# Patient Record
Sex: FEMALE | Race: ASIAN | NOT HISPANIC OR LATINO
[De-identification: names, ages, dates, MRNs, and addresses within clinical notes are randomized per-mention and may not be internally consistent; named-entity substitution may affect disease eponyms.]

---

## 2018-10-25 ENCOUNTER — APPOINTMENT (OUTPATIENT)
Dept: SURGERY | Facility: CLINIC | Age: 33
End: 2018-10-25
Payer: COMMERCIAL

## 2018-10-25 VITALS
SYSTOLIC BLOOD PRESSURE: 132 MMHG | HEART RATE: 67 BPM | DIASTOLIC BLOOD PRESSURE: 78 MMHG | TEMPERATURE: 98.2 F | HEIGHT: 63 IN | BODY MASS INDEX: 27.46 KG/M2 | WEIGHT: 155 LBS

## 2018-10-25 PROCEDURE — 99203 OFFICE O/P NEW LOW 30 MIN: CPT

## 2018-10-27 LAB
ANION GAP SERPL CALC-SCNC: 13 MMOL/L
BUN SERPL-MCNC: 9 MG/DL
CALCIUM SERPL-MCNC: 9.2 MG/DL
CHLORIDE SERPL-SCNC: 104 MMOL/L
CO2 SERPL-SCNC: 24 MMOL/L
CREAT SERPL-MCNC: 0.53 MG/DL
GLUCOSE SERPL-MCNC: 90 MG/DL
POTASSIUM SERPL-SCNC: 3.9 MMOL/L
SODIUM SERPL-SCNC: 141 MMOL/L

## 2021-02-03 ENCOUNTER — APPOINTMENT (OUTPATIENT)
Dept: SURGERY | Facility: CLINIC | Age: 36
End: 2021-02-03
Payer: COMMERCIAL

## 2021-02-03 VITALS
HEART RATE: 76 BPM | WEIGHT: 162 LBS | TEMPERATURE: 97.7 F | BODY MASS INDEX: 28.7 KG/M2 | HEIGHT: 63 IN | DIASTOLIC BLOOD PRESSURE: 94 MMHG | SYSTOLIC BLOOD PRESSURE: 139 MMHG

## 2021-02-03 DIAGNOSIS — I10 ESSENTIAL (PRIMARY) HYPERTENSION: ICD-10-CM

## 2021-02-03 DIAGNOSIS — Z82.49 FAMILY HISTORY OF ISCHEMIC HEART DISEASE AND OTHER DISEASES OF THE CIRCULATORY SYSTEM: ICD-10-CM

## 2021-02-03 DIAGNOSIS — Z83.3 FAMILY HISTORY OF DIABETES MELLITUS: ICD-10-CM

## 2021-02-03 DIAGNOSIS — K60.3 ANAL FISTULA: ICD-10-CM

## 2021-02-03 DIAGNOSIS — E07.9 DISORDER OF THYROID, UNSPECIFIED: ICD-10-CM

## 2021-02-03 DIAGNOSIS — Z56.0 UNEMPLOYMENT, UNSPECIFIED: ICD-10-CM

## 2021-02-03 PROCEDURE — 99213 OFFICE O/P EST LOW 20 MIN: CPT

## 2021-02-03 PROCEDURE — 99072 ADDL SUPL MATRL&STAF TM PHE: CPT

## 2021-02-03 RX ORDER — LEVOTHYROXINE SODIUM 0.17 MG/1
TABLET ORAL
Refills: 0 | Status: ACTIVE | COMMUNITY

## 2021-02-03 RX ORDER — HYDROCHLOROTHIAZIDE 12.5 MG/1
TABLET ORAL
Refills: 0 | Status: ACTIVE | COMMUNITY

## 2021-02-03 RX ORDER — CHROMIUM 200 MCG
TABLET ORAL
Refills: 0 | Status: ACTIVE | COMMUNITY

## 2021-02-03 SDOH — ECONOMIC STABILITY - INCOME SECURITY: UNEMPLOYMENT, UNSPECIFIED: Z56.0

## 2021-02-04 PROBLEM — I10 HIGH BLOOD PRESSURE: Status: ACTIVE | Noted: 2021-02-03

## 2021-02-05 PROBLEM — K60.3 ANAL FISTULA: Status: ACTIVE | Noted: 2021-02-05

## 2021-02-05 RX ORDER — CLINDAMYCIN HYDROCHLORIDE 300 MG/1
300 CAPSULE ORAL
Qty: 30 | Refills: 0 | Status: DISCONTINUED | COMMUNITY
Start: 2021-01-28

## 2021-02-05 RX ORDER — NAPROXEN 500 MG/1
500 TABLET ORAL
Qty: 30 | Refills: 0 | Status: DISCONTINUED | COMMUNITY
Start: 2020-08-06

## 2021-02-05 RX ORDER — ERGOCALCIFEROL 1.25 MG/1
1.25 MG CAPSULE, LIQUID FILLED ORAL
Qty: 12 | Refills: 0 | Status: ACTIVE | COMMUNITY
Start: 2020-12-26

## 2021-02-05 RX ORDER — DOCUSATE SODIUM 100 MG/1
100 CAPSULE, LIQUID FILLED ORAL
Qty: 30 | Refills: 0 | Status: ACTIVE | COMMUNITY
Start: 2020-10-12

## 2021-02-05 RX ORDER — ACETAMINOPHEN EXTRA STRENGTH 500 MG/1
500 TABLET ORAL
Qty: 32 | Refills: 0 | Status: DISCONTINUED | COMMUNITY
Start: 2020-10-31

## 2021-02-05 RX ORDER — IBUPROFEN 400 MG/1
400 TABLET, FILM COATED ORAL
Qty: 16 | Refills: 0 | Status: DISCONTINUED | COMMUNITY
Start: 2020-10-31

## 2021-02-05 RX ORDER — BACITRACIN 500 [IU]/G
500 OINTMENT TOPICAL
Qty: 28 | Refills: 0 | Status: DISCONTINUED | COMMUNITY
Start: 2020-10-31

## 2021-02-05 RX ORDER — OXYCODONE AND ACETAMINOPHEN 5; 325 MG/1; MG/1
5-325 TABLET ORAL
Qty: 8 | Refills: 0 | Status: ACTIVE | COMMUNITY
Start: 2020-10-12

## 2021-02-05 RX ORDER — MECLIZINE HYDROCHLORIDE 12.5 MG/1
12.5 TABLET ORAL
Qty: 30 | Refills: 0 | Status: ACTIVE | COMMUNITY
Start: 2020-12-26

## 2021-02-05 RX ORDER — TIZANIDINE 2 MG/1
2 TABLET ORAL
Qty: 30 | Refills: 0 | Status: ACTIVE | COMMUNITY
Start: 2020-08-06

## 2021-02-05 NOTE — PHYSICAL EXAM
[No Rash or Lesion] : No rash or lesion [Alert] : alert [Oriented to Person] : oriented to person [Oriented to Place] : oriented to place [Oriented to Time] : oriented to time [Calm] : calm [de-identified] : The patient is alert, well-groomed, well developed and cheerful.  [de-identified] : Head is normocephalic. Conjunctiva pink, anicteric. Nasal mucosa pink, septum midline. Oral mucosa pink. Tongue midline, pharynx without exudates. \par \par   [de-identified] : Neck supple. Trachea midline. Thyroid isthmus barely palpable, lobes not felt.\par   [de-identified] : Breath sounds equal and bilateral, no wheezing no rales or rhonchi  [de-identified] :  good S1, S2, no m/r/g bilateral  [de-identified] : Normoactive bowel sounds, soft and nontender, no hepatosplenomegaly or masses noted, [de-identified] : right perirectal hardness no drainage identified and no erythema, mildly tender to deep palpation of right inschiorectal fossa.  [de-identified] : WNL

## 2021-02-05 NOTE — HISTORY OF PRESENT ILLNESS
[de-identified] : KEITH LY is a 35 year old female who presents in the office for follow up visit for perirectal abscess.Patient has this condition for over 2 years. This abscess was drained 2 times and she had a hospital stay in 2018 in [facility] and was on IV antibiotics and had IR drainage at that time. Patient denies any fevers, chills, nausea, vomiting, diarrhea or constipation. Patient able to tolerate regular diet with normal bowel movements. Today she is c/o some hardness on right perianal aria, without any drainage.  [de-identified] : \par

## 2021-02-05 NOTE — ASSESSMENT
[FreeTextEntry1] : KEITH LY is a 35 year old female with perirectal abscess vs  fissure. \par \par \par Patient has a history of perirectal abscess was drained 2 times and she had a hospital stay in 2018 in [facility] and was on IV antibiotics and had IR drainage at that time. Patient stated that she had MRI done and will obtain the report and bring it in the office. On physical exam right perirectal hardness without any drainage. \par Patient will be referred to colorectal surgeon--Dr. Rojo for treatment of what seems to be a chronic supralevator fistula/abscess

## 2021-02-05 NOTE — CONSULT LETTER
[Dear  ___] : Dear  [unfilled], [Courtesy Letter:] : I had the pleasure of seeing your patient, [unfilled], in my office today. [Consult Closing:] : Thank you very much for allowing me to participate in the care of this patient.  If you have any questions, please do not hesitate to contact me. [DrDong  ___] : Dr. COBB

## 2021-02-09 ENCOUNTER — APPOINTMENT (OUTPATIENT)
Dept: SURGERY | Facility: CLINIC | Age: 36
End: 2021-02-09
Payer: COMMERCIAL

## 2021-02-09 VITALS
BODY MASS INDEX: 28.7 KG/M2 | SYSTOLIC BLOOD PRESSURE: 119 MMHG | DIASTOLIC BLOOD PRESSURE: 83 MMHG | TEMPERATURE: 97.8 F | HEART RATE: 81 BPM | HEIGHT: 63 IN | WEIGHT: 162 LBS | OXYGEN SATURATION: 99 %

## 2021-02-09 DIAGNOSIS — Z86.79 PERSONAL HISTORY OF OTHER DISEASES OF THE CIRCULATORY SYSTEM: ICD-10-CM

## 2021-02-09 DIAGNOSIS — Z86.39 PERSONAL HISTORY OF OTHER ENDOCRINE, NUTRITIONAL AND METABOLIC DISEASE: ICD-10-CM

## 2021-02-09 DIAGNOSIS — R10.84 GENERALIZED ABDOMINAL PAIN: ICD-10-CM

## 2021-02-09 PROCEDURE — 99203 OFFICE O/P NEW LOW 30 MIN: CPT | Mod: 25

## 2021-02-09 PROCEDURE — 46600 DIAGNOSTIC ANOSCOPY SPX: CPT

## 2021-02-09 PROCEDURE — 99072 ADDL SUPL MATRL&STAF TM PHE: CPT

## 2021-02-09 NOTE — REVIEW OF SYSTEMS
[Fever] : no fever [Chills] : no chills [Recent Weight Loss (___ Lbs)] : no recent weight loss [Eye Pain] : no eye pain [Earache] : no earache [Shortness Of Breath] : no shortness of breath [Cough] : no cough [Abdominal Pain] : abdominal pain [Vomiting] : no vomiting [Constipation] : no constipation [Dysuria] : no dysuria [Diarrhea] : no diarrhea [Negative] : Musculoskeletal

## 2021-02-09 NOTE — HISTORY OF PRESENT ILLNESS
[FreeTextEntry1] : Ms. Gauri Copeland is a 35y.o. F referred for recurrent perirectal pain. This started back in 2018 at which time she had a perirectal abscess drained at [facility] at which time she was admitted and treated with abx for 9days. More recently she underwent an EUA on 2/10/20 (no fistula identified at the time) and then another drainage procedure on [place]12/20 ([place] [facility] She states that ever since this all started she has had recurrent swelling and pain on her right perirectal area but w/o any drainage. She reports a prior history of constipation which improved after her pregnancy. Now she has almost daily BMs which are soft and occasionally involve straining. She denies any personal or family history of IBD. Unclear if she has had a colonoscopy in the past. She also reports some intermittent RLQ abdominal pain at night for the past few months centered over her open appy scar. Last pelvic imaging was prior to the drainage in October and was a MRI for which she doesn't have the report. Has sex w/ men and denies anal receptive sex.

## 2021-02-09 NOTE — PHYSICAL EXAM
[Excoriation] : no perianal excoriation [Fistula] : no fistulas [Skin Tags] : there were no residual hemorrhoidal skin tags seen [Normal] : was normal [Right Side] : on the right [None] : there was no rectal abscess [Stool Sample Taken] : no stool obtained on rectal exam [Gross Blood] : no gross blood [JVD] : no jugular venous distention  [Normal Breath Sounds] : Normal breath sounds [Wheezing] : no wheezing was heard [Normal Heart Sounds] : normal heart sounds [Normal Rate and Rhythm] : normal rate and rhythm [Alert] : alert [Oriented to Person] : oriented to person [Oriented to Place] : oriented to place [Oriented to Time] : oriented to time [de-identified] : soft, non-distended, non-tender to palpitation, well healed, RLQ open appy scar [de-identified] : no external hemorrhoids, scar from prior I&D w/o any palpable fluctuance, on JULIEN mild firmness on the R side w/ associated discomfort, significant pain from anoscopy [de-identified] : awake, alert, in NAD [de-identified] : normocephalic, atraumatic, EOMI, nl conjunctiva [de-identified] : b/l chest rise, EWOB on RA [de-identified] : deferred [de-identified] : normal strength [de-identified] : perianal skin as above [de-identified] : normal mood and affect

## 2021-04-28 NOTE — PROCEDURE
[phone] [FreeTextEntry1] : Anoscopy - with large lighted disposable anoscope; exam limited by discomfort; normal rectal mucosa some internal hemorrhoids

## 2021-04-29 ENCOUNTER — APPOINTMENT (OUTPATIENT)
Dept: SURGERY | Facility: CLINIC | Age: 36
End: 2021-04-29
Payer: COMMERCIAL

## 2021-04-29 VITALS
BODY MASS INDEX: 29.23 KG/M2 | HEART RATE: 93 BPM | HEIGHT: 63 IN | SYSTOLIC BLOOD PRESSURE: 121 MMHG | DIASTOLIC BLOOD PRESSURE: 79 MMHG | WEIGHT: 165 LBS

## 2021-04-29 VITALS — TEMPERATURE: 96.6 F

## 2021-04-29 DIAGNOSIS — L02.31 CUTANEOUS ABSCESS OF BUTTOCK: ICD-10-CM

## 2021-04-29 PROCEDURE — 46600 DIAGNOSTIC ANOSCOPY SPX: CPT

## 2021-04-29 PROCEDURE — 99072 ADDL SUPL MATRL&STAF TM PHE: CPT

## 2021-04-30 ENCOUNTER — EMERGENCY (EMERGENCY)
Facility: HOSPITAL | Age: 36
LOS: 1 days | Discharge: HOME | End: 2021-04-30
Attending: STUDENT IN AN ORGANIZED HEALTH CARE EDUCATION/TRAINING PROGRAM
Payer: COMMERCIAL

## 2021-04-30 VITALS
TEMPERATURE: 98 F | DIASTOLIC BLOOD PRESSURE: 87 MMHG | RESPIRATION RATE: 17 BRPM | HEART RATE: 85 BPM | SYSTOLIC BLOOD PRESSURE: 133 MMHG | WEIGHT: 165.35 LBS | OXYGEN SATURATION: 100 %

## 2021-04-30 PROCEDURE — 99283 EMERGENCY DEPT VISIT LOW MDM: CPT

## 2021-04-30 PROCEDURE — 99284 EMERGENCY DEPT VISIT MOD MDM: CPT

## 2021-04-30 NOTE — PROCEDURE
[FreeTextEntry1] : Anoscopy - performed with small lighted disposable anoscope; normal rectal mucosa, unremarkable internal hemorrhoids

## 2021-04-30 NOTE — ED PROVIDER NOTE - SKIN WOUND DESCRIPTION
2x2 cm induration in the surrounding muscle, not perianal abscess, not involving the anal canal, mild to touch, no crepitus, no underline skin changes 2x2 cm induration deep in the gluteus muscle, no fluctuance, no discoloration or erythema of overlying skin, mildly TTT, no crepitus, no perianal masses, no pain or abscesses felt on JULIEN,  no enlarged lymphnodes

## 2021-04-30 NOTE — ED ADULT NURSE NOTE - OBJECTIVE STATEMENT
pt from home c/o of Rt buttock pain radiating to Rt upper thigh on and off since 2018, pt reports pain worsening in the last 2 months, denies any recent injury ambulatory with steady gait

## 2021-04-30 NOTE — ED PROVIDER NOTE - PATIENT PORTAL LINK FT
You can access the FollowMyHealth Patient Portal offered by [facility] by registering at the following website: [email or web address]. By joining [employer]’s FollowMyHealth portal, you will also be able to view your health information using other applications (apps) compatible with our system.

## 2021-04-30 NOTE — ASSESSMENT
[FreeTextEntry1] : 35y.o. F w/ recurrent perirectal abscess and now recurrent pain and swelling of unclear etiology. \par

## 2021-04-30 NOTE — ED PROVIDER NOTE - CLINICAL SUMMARY MEDICAL DECISION MAKING FREE TEXT BOX
Likely cystic in structure vs reoccurrence abscess. Really needs to f/u with surgeon to get the necessary imaging and the pt agrees. Told to come back if the pt develops fever or worsening pain. Likely cystic structure given reoccurrence multiple times. Less likely abscess as patient is not very tender in the location, and shows no systemic symptoms. Really needs to f/u with surgeon to get the necessary imaging and the pt agrees. Told to come back if the pt develops fever or worsening pain.

## 2021-04-30 NOTE — ED PROVIDER NOTE - OBJECTIVE STATEMENT
36 y/o F pt with a significant PMHx of repeated right buttock abscess drained multiple times and seen by [facility] surgeon who is following her closely presents to the ED with c/o pain and induration of the buttock. Patient states that her surgeon wanted her to go for a CT out patient but, the insurance did not want to cover it. Patient denies any fever, rectal pain, nausea, vomiting, diarrhea, change in bowel habits, systemic symptoms, or any other complaints. Asked patient to wait to get the CT out patient to wait for it to get approved and to f/u with colorectal surgeon.

## 2021-04-30 NOTE — HISTORY OF PRESENT ILLNESS
[FreeTextEntry1] : Ms. Gauri Copeland is a 35y.o. F referred for recurrent perirectal pain and presenting for follow-up. The pain started back in 2018 at which time she had a perirectal abscess drained at [facility] at which time she was admitted and treated with abx for 9days. More recently she underwent an EUA on 2/10/20 (no fistula identified at the time) and then another drainage procedure on [place]/20 ([place] [facility] She states that ever since this all started she has had recurrent swelling and pain on her right perirectal area but w/o any drainage. She reports a prior history of constipation which improved after her pregnancy. Now she has almost daily BMs which are soft and occasionally involve straining. She denies any personal or family history of IBD. Unclear if she has had a colonoscopy in the past. When I saw her a couple of months ago I recommended a CT scan which she ended up obtaining at [facility]. However she says when she had the scan she wasn't having any of the symptoms but recently has developed recurrent symptoms w/ swelling and firmness in her R buttock. As for the RLQ abdominal pain she reports that has resolved.

## 2021-04-30 NOTE — ED ADULT NURSE NOTE - NSIMPLEMENTINTERV_GEN_ALL_ED
Implemented All Universal Safety Interventions:  [place] to call system. Call bell, personal items and telephone within reach. Instruct patient to call for assistance. Room bathroom lighting operational. Non-slip footwear when patient is off stretcher. Physically safe environment: no spills, clutter or unnecessary equipment. Stretcher in lowest position, wheels locked, appropriate side rails in place.

## 2021-04-30 NOTE — PHYSICAL EXAM
[Normal] : was normal [None] : there was no rectal abscess [Normal Rate and Rhythm] : normal rate and rhythm [Alert] : alert [Oriented to Person] : oriented to person [Oriented to Place] : oriented to place [Oriented to Time] : oriented to time [Gross Blood] : no gross blood [JVD] : no jugular venous distention  [de-identified] : soft, non-distended [de-identified] : externally prior I&D site on the R noted w/o any erythema/fluctuance/pain, R buttock firm in comparison to the L, no pain on JULIEN [de-identified] : prior I&D site [de-identified] : awake, alert, in NAD [de-identified] : normocephalic, atraumatic, EOMI, nl conjunctiva [de-identified] : b/l chest rise, EWOB on RA [de-identified] : deferred [de-identified] : normal strength [de-identified] : perianal skin as above [de-identified] : normal mood and affect

## 2021-05-03 RX ORDER — ACETAMINOPHEN 500 MG
2 TABLET ORAL
Qty: 20 | Refills: 0
Start: 2021-05-03 | End: 2021-05-12

## 2021-05-04 LAB
ANION GAP SERPL CALC-SCNC: 13 MMOL/L
BUN SERPL-MCNC: 13 MG/DL
CALCIUM SERPL-MCNC: 9.6 MG/DL
CHLORIDE SERPL-SCNC: 101 MMOL/L
CO2 SERPL-SCNC: 26 MMOL/L
CREAT SERPL-MCNC: 0.58 MG/DL
GLUCOSE SERPL-MCNC: 107 MG/DL
POTASSIUM SERPL-SCNC: 3.2 MMOL/L
SODIUM SERPL-SCNC: 140 MMOL/L

## 2021-05-06 PROBLEM — I10 ESSENTIAL (PRIMARY) HYPERTENSION: Chronic | Status: ACTIVE | Noted: 2021-04-30

## 2021-05-06 PROBLEM — E03.9 HYPOTHYROIDISM, UNSPECIFIED: Chronic | Status: ACTIVE | Noted: 2021-04-30

## 2021-05-10 ENCOUNTER — APPOINTMENT (OUTPATIENT)
Dept: CT IMAGING | Facility: HOSPITAL | Age: 36
End: 2021-05-10
Payer: COMMERCIAL

## 2021-05-10 ENCOUNTER — OUTPATIENT (OUTPATIENT)
Dept: OUTPATIENT SERVICES | Facility: HOSPITAL | Age: 36
LOS: 1 days | End: 2021-05-10
Payer: COMMERCIAL

## 2021-05-10 DIAGNOSIS — L02.31 CUTANEOUS ABSCESS OF BUTTOCK: ICD-10-CM

## 2021-05-10 LAB — HCG UR QL: NEGATIVE — SIGNIFICANT CHANGE UP

## 2021-05-10 PROCEDURE — 81025 URINE PREGNANCY TEST: CPT

## 2021-05-10 PROCEDURE — 72193 CT PELVIS W/DYE: CPT

## 2021-05-10 PROCEDURE — 72193 CT PELVIS W/DYE: CPT | Mod: 26

## 2021-05-21 ENCOUNTER — OUTPATIENT (OUTPATIENT)
Dept: OUTPATIENT SERVICES | Facility: HOSPITAL | Age: 36
LOS: 1 days | End: 2021-05-21
Payer: COMMERCIAL

## 2021-05-21 VITALS
WEIGHT: 166.89 LBS | TEMPERATURE: 99 F | HEIGHT: 63 IN | HEART RATE: 61 BPM | SYSTOLIC BLOOD PRESSURE: 131 MMHG | DIASTOLIC BLOOD PRESSURE: 85 MMHG | RESPIRATION RATE: 18 BRPM | OXYGEN SATURATION: 100 %

## 2021-05-21 DIAGNOSIS — I10 ESSENTIAL (PRIMARY) HYPERTENSION: ICD-10-CM

## 2021-05-21 DIAGNOSIS — K60.3 ANAL FISTULA: ICD-10-CM

## 2021-05-21 DIAGNOSIS — Z98.890 OTHER SPECIFIED POSTPROCEDURAL STATES: Chronic | ICD-10-CM

## 2021-05-21 DIAGNOSIS — E03.9 HYPOTHYROIDISM, UNSPECIFIED: ICD-10-CM

## 2021-05-21 DIAGNOSIS — Z98.891 HISTORY OF UTERINE SCAR FROM PREVIOUS SURGERY: Chronic | ICD-10-CM

## 2021-05-21 DIAGNOSIS — Z90.49 ACQUIRED ABSENCE OF OTHER SPECIFIED PARTS OF DIGESTIVE TRACT: Chronic | ICD-10-CM

## 2021-05-21 DIAGNOSIS — Z01.818 ENCOUNTER FOR OTHER PREPROCEDURAL EXAMINATION: ICD-10-CM

## 2021-05-21 LAB
ALBUMIN SERPL ELPH-MCNC: 3.8 G/DL — SIGNIFICANT CHANGE UP (ref 3.5–5)
ALP SERPL-CCNC: 81 U/L — SIGNIFICANT CHANGE UP (ref 40–120)
ALT FLD-CCNC: 31 U/L DA — SIGNIFICANT CHANGE UP (ref 10–60)
ANION GAP SERPL CALC-SCNC: 8 MMOL/L — SIGNIFICANT CHANGE UP (ref 5–17)
APPEARANCE UR: CLEAR — SIGNIFICANT CHANGE UP
APTT BLD: 34.3 SEC — SIGNIFICANT CHANGE UP (ref 27.5–35.5)
AST SERPL-CCNC: 20 U/L — SIGNIFICANT CHANGE UP (ref 10–40)
BACTERIA # UR AUTO: ABNORMAL /HPF
BILIRUB SERPL-MCNC: 0.5 MG/DL — SIGNIFICANT CHANGE UP (ref 0.2–1.2)
BILIRUB UR-MCNC: NEGATIVE — SIGNIFICANT CHANGE UP
BUN SERPL-MCNC: 9 MG/DL — SIGNIFICANT CHANGE UP (ref 7–18)
CALCIUM SERPL-MCNC: 9.2 MG/DL — SIGNIFICANT CHANGE UP (ref 8.4–10.5)
CHLORIDE SERPL-SCNC: 103 MMOL/L — SIGNIFICANT CHANGE UP (ref 96–108)
CO2 SERPL-SCNC: 27 MMOL/L — SIGNIFICANT CHANGE UP (ref 22–31)
COLOR SPEC: YELLOW — SIGNIFICANT CHANGE UP
COMMENT - URINE: SIGNIFICANT CHANGE UP
CREAT SERPL-MCNC: 0.47 MG/DL — LOW (ref 0.5–1.3)
DIFF PNL FLD: ABNORMAL
EPI CELLS # UR: SIGNIFICANT CHANGE UP /HPF
GLUCOSE SERPL-MCNC: 79 MG/DL — SIGNIFICANT CHANGE UP (ref 70–99)
GLUCOSE UR QL: NEGATIVE — SIGNIFICANT CHANGE UP
HCG SERPL-ACNC: <1 MIU/ML — SIGNIFICANT CHANGE UP
HCT VFR BLD CALC: 34 % — LOW (ref 34.5–45)
HGB BLD-MCNC: 10.6 G/DL — LOW (ref 11.5–15.5)
INR BLD: 1.04 RATIO — SIGNIFICANT CHANGE UP (ref 0.88–1.16)
KETONES UR-MCNC: NEGATIVE — SIGNIFICANT CHANGE UP
LEUKOCYTE ESTERASE UR-ACNC: NEGATIVE — SIGNIFICANT CHANGE UP
MCHC RBC-ENTMCNC: 24.5 PG — LOW (ref 27–34)
MCHC RBC-ENTMCNC: 31.2 GM/DL — LOW (ref 32–36)
MCV RBC AUTO: 78.5 FL — LOW (ref 80–100)
NITRITE UR-MCNC: NEGATIVE — SIGNIFICANT CHANGE UP
NRBC # BLD: 0 /100 WBCS — SIGNIFICANT CHANGE UP (ref 0–0)
PH UR: 6 — SIGNIFICANT CHANGE UP (ref 5–8)
PLATELET # BLD AUTO: 357 K/UL — SIGNIFICANT CHANGE UP (ref 150–400)
POTASSIUM SERPL-MCNC: 3.2 MMOL/L — LOW (ref 3.5–5.3)
POTASSIUM SERPL-SCNC: 3.2 MMOL/L — LOW (ref 3.5–5.3)
PROT SERPL-MCNC: 7.8 G/DL — SIGNIFICANT CHANGE UP (ref 6–8.3)
PROT UR-MCNC: 15
PROTHROM AB SERPL-ACNC: 12.3 SEC — SIGNIFICANT CHANGE UP (ref 10.6–13.6)
RBC # BLD: 4.33 M/UL — SIGNIFICANT CHANGE UP (ref 3.8–5.2)
RBC # FLD: 15.4 % — HIGH (ref 10.3–14.5)
RBC CASTS # UR COMP ASSIST: SIGNIFICANT CHANGE UP /HPF (ref 0–2)
SODIUM SERPL-SCNC: 138 MMOL/L — SIGNIFICANT CHANGE UP (ref 135–145)
SP GR SPEC: 1.02 — SIGNIFICANT CHANGE UP (ref 1.01–1.02)
UROBILINOGEN FLD QL: NEGATIVE — SIGNIFICANT CHANGE UP
WBC # BLD: 7.41 K/UL — SIGNIFICANT CHANGE UP (ref 3.8–10.5)
WBC # FLD AUTO: 7.41 K/UL — SIGNIFICANT CHANGE UP (ref 3.8–10.5)
WBC UR QL: SIGNIFICANT CHANGE UP /HPF (ref 0–5)

## 2021-05-21 PROCEDURE — 85610 PROTHROMBIN TIME: CPT

## 2021-05-21 PROCEDURE — 36415 COLL VENOUS BLD VENIPUNCTURE: CPT

## 2021-05-21 PROCEDURE — 80053 COMPREHEN METABOLIC PANEL: CPT

## 2021-05-21 PROCEDURE — 85027 COMPLETE CBC AUTOMATED: CPT

## 2021-05-21 PROCEDURE — 85730 THROMBOPLASTIN TIME PARTIAL: CPT

## 2021-05-21 PROCEDURE — 84702 CHORIONIC GONADOTROPIN TEST: CPT

## 2021-05-21 PROCEDURE — 81001 URINALYSIS AUTO W/SCOPE: CPT

## 2021-05-21 PROCEDURE — G0463: CPT

## 2021-05-21 RX ORDER — LEVOTHYROXINE SODIUM 125 MCG
0 TABLET ORAL
Qty: 0 | Refills: 0 | DISCHARGE

## 2021-05-21 NOTE — H&P PST ADULT - ATTENDING COMMENTS
OR today for EUA, partial fistulotomy, seton placement  prone jackknife  MAC for anesthesia  0.5% marcaine and exparel

## 2021-05-21 NOTE — H&P PST ADULT - NSICDXPASTMEDICALHX_GEN_ALL_CORE_FT
PAST MEDICAL HISTORY:  Hypertension     Hypothyroid      PAST MEDICAL HISTORY:  Anal fistula     Hypertension     Hypothyroid

## 2021-05-21 NOTE — H&P PST ADULT - NSICDXPASTSURGICALHX_GEN_ALL_CORE_FT
PAST SURGICAL HISTORY:  History of incision and drainage perirectal abscess in 2018    History of incision and drainage of perirectal abscess, EUA on 02/10/2020     PAST SURGICAL HISTORY:  H/O  section 01/15/2014    History of appendectomy     History of incision and drainage perirectal abscess in 2018    History of incision and drainage of perirectal abscess, EUA on 02/10/2020

## 2021-05-21 NOTE — H&P PST ADULT - NSICDXPROBLEM_GEN_ALL_CORE_FT
PROBLEM DIAGNOSES  Problem: Anal fistula  Assessment and Plan: Examination under anesthesia, partial fistulotomy, incision and drainage of perirectal abscess, seton placement on 05/26/2021. Preoperative instructions discussed with pt and given to pt. Instructed pt that she will need someone to escort her home after surgery, not to eat or drink anything after midnight the night before the surgery, to avoid NSAIDS such as Ibuprofen, motrin, aleve, advil, naproxen before surgery, to take Tylenol if needed for pain, to report if she has been exposed to any one with any contagious diseases including Covid-19 or if she is exhibiting any symptoms of COVID-19, to keep appointment for COVID-19  test 3 days before surgery. Instructed about use of Chlorhexidine 4% soap before surgery. Verbalized understanding of instructions given.     Problem: HTN (hypertension)  Assessment and Plan: Instructed to continue antihypertensive meds and take with sips of water on day of surgery. Pt to be cleared by PCP on Monday. Follow-up with PCP for management.     Problem: Hypothyroidism  Assessment and Plan: Instructed to continue Levothyroxine and take with sips of water on day of surgery. Follow-up with provider for management.

## 2021-05-21 NOTE — H&P PST ADULT - HISTORY OF PRESENT ILLNESS
35 yr old female with PMH of hypothyroidism, hypertension presents with c/o  This is a 35 yr old female with PMH of hypothyroidism, hypertension who presents with c/o right perirectal pain for years. Pt reports that she underwent incision and drainage of perirectal abscess in 2018 at [facility]. On February 10, 2020 she underwent Examination under anesthesia and drainage for recurrent abscess at [facility]. Pt stated that pain and swelling had not relieved since surgery. Denies any drainage, bleeding or constipation. Pt is scheduled for examination under anesthesia, partial fistulotomy, incision and drainage of perirectal abscess, seton placement on 05/26/2021.

## 2021-05-21 NOTE — H&P PST ADULT - ASSESSMENT
This is a 35 yr old female with PMH of hypothyroidism, hypertension who presents with anal fistula. Pt is scheduled for examination under anesthesia, partial fistulotomy, incision and drainage of perirectal abscess, seton placement on 05/26/2021.

## 2021-05-21 NOTE — H&P PST ADULT - NSANTHOSAYNRD_GEN_A_CORE
No. PIPER screening performed.  STOP BANG Legend: 0-2 = LOW Risk; 3-4 = INTERMEDIATE Risk; 5-8 = HIGH Risk

## 2021-05-22 DIAGNOSIS — Z01.818 ENCOUNTER FOR OTHER PREPROCEDURAL EXAMINATION: ICD-10-CM

## 2021-05-23 ENCOUNTER — APPOINTMENT (OUTPATIENT)
Dept: DISASTER EMERGENCY | Facility: CLINIC | Age: 36
End: 2021-05-23

## 2021-05-23 LAB — SARS-COV-2 N GENE NPH QL NAA+PROBE: NOT DETECTED

## 2021-05-25 ENCOUNTER — TRANSCRIPTION ENCOUNTER (OUTPATIENT)
Age: 36
End: 2021-05-25

## 2021-05-26 ENCOUNTER — OUTPATIENT (OUTPATIENT)
Dept: OUTPATIENT SERVICES | Facility: HOSPITAL | Age: 36
LOS: 1 days | End: 2021-05-26
Payer: COMMERCIAL

## 2021-05-26 ENCOUNTER — APPOINTMENT (OUTPATIENT)
Dept: SURGERY | Facility: HOSPITAL | Age: 36
End: 2021-05-26
Payer: COMMERCIAL

## 2021-05-26 ENCOUNTER — RESULT REVIEW (OUTPATIENT)
Age: 36
End: 2021-05-26

## 2021-05-26 VITALS
DIASTOLIC BLOOD PRESSURE: 74 MMHG | TEMPERATURE: 98 F | HEIGHT: 63 IN | WEIGHT: 166.89 LBS | SYSTOLIC BLOOD PRESSURE: 113 MMHG | RESPIRATION RATE: 17 BRPM | OXYGEN SATURATION: 100 % | HEART RATE: 75 BPM

## 2021-05-26 VITALS
SYSTOLIC BLOOD PRESSURE: 119 MMHG | OXYGEN SATURATION: 98 % | RESPIRATION RATE: 18 BRPM | DIASTOLIC BLOOD PRESSURE: 71 MMHG | HEART RATE: 66 BPM | TEMPERATURE: 98 F

## 2021-05-26 DIAGNOSIS — Z98.891 HISTORY OF UTERINE SCAR FROM PREVIOUS SURGERY: Chronic | ICD-10-CM

## 2021-05-26 DIAGNOSIS — Z98.890 OTHER SPECIFIED POSTPROCEDURAL STATES: Chronic | ICD-10-CM

## 2021-05-26 DIAGNOSIS — Z90.49 ACQUIRED ABSENCE OF OTHER SPECIFIED PARTS OF DIGESTIVE TRACT: Chronic | ICD-10-CM

## 2021-05-26 DIAGNOSIS — K60.3 ANAL FISTULA: ICD-10-CM

## 2021-05-26 PROCEDURE — 46270 REMOVE ANAL FIST SUBQ: CPT

## 2021-05-26 PROCEDURE — C1889: CPT

## 2021-05-26 PROCEDURE — 46050 I&D PERIANAL ABSCESS SUPFC: CPT

## 2021-05-26 PROCEDURE — 88304 TISSUE EXAM BY PATHOLOGIST: CPT | Mod: 26

## 2021-05-26 PROCEDURE — ZZZZZ: CPT

## 2021-05-26 PROCEDURE — 46280 REMOVE ANAL FIST COMPLEX: CPT

## 2021-05-26 PROCEDURE — 88304 TISSUE EXAM BY PATHOLOGIST: CPT

## 2021-05-26 RX ORDER — HYDROMORPHONE HYDROCHLORIDE 2 MG/ML
0.5 INJECTION INTRAMUSCULAR; INTRAVENOUS; SUBCUTANEOUS
Refills: 0 | Status: DISCONTINUED | OUTPATIENT
Start: 2021-05-26 | End: 2021-05-26

## 2021-05-26 RX ORDER — HYDROMORPHONE HYDROCHLORIDE 2 MG/ML
1 INJECTION INTRAMUSCULAR; INTRAVENOUS; SUBCUTANEOUS
Refills: 0 | Status: DISCONTINUED | OUTPATIENT
Start: 2021-05-26 | End: 2021-05-26

## 2021-05-26 RX ORDER — LEVOTHYROXINE SODIUM 125 MCG
1 TABLET ORAL
Qty: 0 | Refills: 0 | DISCHARGE

## 2021-05-26 RX ORDER — SODIUM CHLORIDE 9 MG/ML
3 INJECTION INTRAMUSCULAR; INTRAVENOUS; SUBCUTANEOUS EVERY 8 HOURS
Refills: 0 | Status: DISCONTINUED | OUTPATIENT
Start: 2021-05-26 | End: 2021-05-26

## 2021-05-26 RX ORDER — ERGOCALCIFEROL 1.25 MG/1
1 CAPSULE ORAL
Qty: 0 | Refills: 0 | DISCHARGE

## 2021-05-26 RX ORDER — SODIUM CHLORIDE 9 MG/ML
1000 INJECTION, SOLUTION INTRAVENOUS
Refills: 0 | Status: DISCONTINUED | OUTPATIENT
Start: 2021-05-26 | End: 2021-05-26

## 2021-05-26 RX ORDER — ACETAMINOPHEN 500 MG
2 TABLET ORAL
Qty: 0 | Refills: 0 | DISCHARGE

## 2021-05-26 RX ORDER — IBUPROFEN 200 MG
400 TABLET ORAL ONCE
Refills: 0 | Status: DISCONTINUED | OUTPATIENT
Start: 2021-05-26 | End: 2021-06-02

## 2021-05-26 RX ORDER — ONDANSETRON 8 MG/1
4 TABLET, FILM COATED ORAL ONCE
Refills: 0 | Status: COMPLETED | OUTPATIENT
Start: 2021-05-26 | End: 2021-05-26

## 2021-05-26 RX ORDER — OXYCODONE HYDROCHLORIDE 5 MG/1
5 TABLET ORAL ONCE
Refills: 0 | Status: DISCONTINUED | OUTPATIENT
Start: 2021-05-26 | End: 2021-06-02

## 2021-05-26 RX ORDER — OXYCODONE HYDROCHLORIDE 5 MG/1
1 TABLET ORAL
Qty: 4 | Refills: 0
Start: 2021-05-26 | End: 2021-05-26

## 2021-05-26 RX ADMIN — HYDROMORPHONE HYDROCHLORIDE 0.5 MILLIGRAM(S): 2 INJECTION INTRAMUSCULAR; INTRAVENOUS; SUBCUTANEOUS at 10:49

## 2021-05-26 RX ADMIN — HYDROMORPHONE HYDROCHLORIDE 0.5 MILLIGRAM(S): 2 INJECTION INTRAMUSCULAR; INTRAVENOUS; SUBCUTANEOUS at 11:10

## 2021-05-26 RX ADMIN — ONDANSETRON 4 MILLIGRAM(S): 8 TABLET, FILM COATED ORAL at 11:57

## 2021-05-26 NOTE — BRIEF OPERATIVE NOTE - NSICDXBRIEFPROCEDURE_GEN_ALL_CORE_FT
PROCEDURES:  Examination of anal fistula under anesthesia 26-May-2021 10:25:30  Perla Nunez  Anal fistulotomy with ligation of fistula tract 26-May-2021 10:26:30  Perla Nunez

## 2021-05-26 NOTE — ASU DISCHARGE PLAN (ADULT/PEDIATRIC) - CARE PROVIDER_API CALL
Char Rojo)  Surgery  [address], [address]  [place] [address]  Phone: [phone]  Fax: [phone]  Follow Up Time:

## 2021-05-26 NOTE — ASU DISCHARGE PLAN (ADULT/PEDIATRIC) - CALL YOUR DOCTOR IF YOU HAVE ANY OF THE FOLLOWING:
Bleeding that does not stop/Swelling that gets worse/Pain not relieved by Medications/Fever greater than (need to indicate Fahrenheit or Celsius)/Wound/Surgical Site with redness, or foul smelling discharge or pus/Increased irritability or sluggishness

## 2021-05-27 PROBLEM — K60.3 ANAL FISTULA: Chronic | Status: ACTIVE | Noted: 2021-05-21

## 2021-06-03 LAB — SURGICAL PATHOLOGY STUDY: SIGNIFICANT CHANGE UP

## 2021-06-08 ENCOUNTER — APPOINTMENT (OUTPATIENT)
Dept: SURGERY | Facility: CLINIC | Age: 36
End: 2021-06-08
Payer: COMMERCIAL

## 2021-06-08 PROCEDURE — 99024 POSTOP FOLLOW-UP VISIT: CPT

## 2021-06-10 NOTE — ASSESSMENT
[FreeTextEntry1] : 35y.o. F w/ recurrent perirectal abscess and recurrent pain and swelling originally concern for anal fistula but now s/p an EUA and excision on 5/26/21 w/o an internal opening identified.\par

## 2021-06-10 NOTE — PHYSICAL EXAM
[Gross Blood] : no gross blood [JVD] : no jugular venous distention  [Normal Rate and Rhythm] : normal rate and rhythm [Alert] : alert [Oriented to Person] : oriented to person [Oriented to Place] : oriented to place [Oriented to Time] : oriented to time [de-identified] : excision site healing well w/o erythema or fluctuance [de-identified] : soft, non-distended [de-identified] : as above [de-identified] : awake, alert, in NAD [de-identified] : normocephalic, atraumatic, EOMI, nl conjunctiva [de-identified] : b/l chest rise, EWOB on RA [de-identified] : deferred [de-identified] : normal strength [de-identified] : perianal skin as above [de-identified] : normal mood and affect

## 2021-06-10 NOTE — HISTORY OF PRESENT ILLNESS
[FreeTextEntry1] : Ms. Gauri Copeland is a 35y.o. F referred for recurrent perirectal pain and presenting for post-op visit. The pain started back in 2018 at which time she had a perirectal abscess drained at [facility] at which time she was admitted and treated with abx for 9days. More recently she underwent an EUA on 2/10/20 (no fistula identified at the time) and then another drainage procedure on 10[place]12/20 ([place] [facility] She states that ever since this all started she has had recurrent swelling and pain on her right perirectal area but w/o any drainage. She denies any personal or family history of IBD. Unclear if she has had a colonoscopy in the past. When I saw her a couple of months ago I recommended a CT scan which she ended up obtaining at [facility]. However she says when she had the scan she wasn't having any of the symptoms but recently has developed recurrent symptoms w/ swelling and firmness in her R buttock which was palpable on my exam and on repeat CT was found to have 2.3cm collection along the gluteus keya. So on 5/26/21 I took her to the OR for an EUA and couldn't find an internal opening consistent w/ a fistula so I cored out as much of the collection/cavity as I could. Since surgery she denies any issues she had some expected drainage from the site which is decreasing.

## 2021-07-20 ENCOUNTER — APPOINTMENT (OUTPATIENT)
Dept: SURGERY | Facility: CLINIC | Age: 36
End: 2021-07-20
Payer: COMMERCIAL

## 2021-07-20 VITALS
DIASTOLIC BLOOD PRESSURE: 79 MMHG | HEIGHT: 63 IN | WEIGHT: 165 LBS | OXYGEN SATURATION: 99 % | SYSTOLIC BLOOD PRESSURE: 117 MMHG | HEART RATE: 85 BPM | BODY MASS INDEX: 29.23 KG/M2

## 2021-07-20 VITALS — TEMPERATURE: 96.8 F

## 2021-07-20 PROCEDURE — 99024 POSTOP FOLLOW-UP VISIT: CPT

## 2021-07-23 NOTE — HISTORY OF PRESENT ILLNESS
[FreeTextEntry1] : Ms. Gauri Copeland is a 35y.o. F referred for recurrent perirectal pain and presenting for follow-up visit. The pain started back in 2018 at which time she had a perirectal abscess drained at [facility] at which time she was admitted and treated with abx for 9days. More recently she underwent an EUA on 2/10/20 (no fistula identified at the time) and then another drainage procedure on 10[place]12/20 ([place] [facility] She states that ever since this all started she has had recurrent swelling and pain on her right perirectal area but w/o any drainage. She denies any personal or family history of IBD. Unclear if she has had a colonoscopy in the past. When I saw her a couple of months ago I recommended a CT scan which she ended up obtaining at [facility]. However she says when she had the scan she wasn't having any of the symptoms but recently has developed recurrent symptoms w/ swelling and firmness in her R buttock which was palpable on my exam and on repeat CT was found to have 2.3cm collection along the gluteus keya. So on 5/26/21 I took her to the OR for an EUA and couldn't find an internal opening consistent w/ a fistula so I cored out as much of the collection/cavity as I could. Since surgery she denies any issues and no longer has any drainage. She does have occasional pain/sensitivity at the I&D site but has not noted any recurrent swelling.

## 2021-07-23 NOTE — PHYSICAL EXAM
[Gross Blood] : no gross blood [JVD] : no jugular venous distention  [Normal Rate and Rhythm] : normal rate and rhythm [Alert] : alert [Oriented to Person] : oriented to person [Oriented to Place] : oriented to place [Oriented to Time] : oriented to time [de-identified] : soft, non-distended [de-identified] : excision site healed well w/o erythema or fluctuance [de-identified] : as above [de-identified] : awake, alert, in NAD [de-identified] : normocephalic, atraumatic, EOMI, nl conjunctiva [de-identified] : b/l chest rise, EWOB on RA [de-identified] : deferred [de-identified] : normal strength [de-identified] : perianal skin as above [de-identified] : normal mood and affect

## 2021-07-27 NOTE — ED ADULT TRIAGE NOTE - ACCOMPANIED BY
HPI:     HPI  45year-old female is here in the office for follow up. Patient complains of chronic knee pain. Still having PT weekly and f/u with Ortho. Patient denies of chest pain, SOB, N/V/C/D, fever, dizziness, syncope, abdominal pain.  There are no ot Smoking status: Current Some Day Smoker        Packs/day: 0.15        Years: 24.00        Pack years: 3.6      Smokeless tobacco: Never Used    Vaping Use      Vaping Use: Never used    Substance and Sexual Activity      Alcohol use: Yes      Drug use: Nev index is 34.86 kg/m². Physical Exam:   Physical Exam  Vitals reviewed. Constitutional:       General: She is not in acute distress. Appearance: She is well-developed. HENT:      Head: Normocephalic and atraumatic.       Right Ear: External ear no Self

## 2021-09-28 ENCOUNTER — APPOINTMENT (OUTPATIENT)
Dept: SURGERY | Facility: CLINIC | Age: 36
End: 2021-09-28

## 2022-05-12 ENCOUNTER — RESULT REVIEW (OUTPATIENT)
Age: 37
End: 2022-05-12

## 2022-08-04 ENCOUNTER — APPOINTMENT (OUTPATIENT)
Dept: SURGERY | Facility: CLINIC | Age: 37
End: 2022-08-04

## 2024-09-09 NOTE — H&P PST ADULT - NEGATIVE RESPIRATORY AND THORAX SYMPTOMS
For Your Information     If your provider ordered any imaging for you today. Our pre-scheduling services will be reaching out to you within 2 business days to schedule this. Prescheduling Services can be reached by calling [phone].    If you are in need of a medication refill, please use one of the following options. You can expect your medication to be filled within 2-3 business days.   1. Call your pharmacy for all medication refills and renewals.   2. [facility]- [email or web address]  3. Call your providers office    If your provider ordered testing today, you will be notified of your lab results within 3-5 business days and imaging results within 7-14 business days, unless specified otherwise. If you have not received your results within the allotted business days please call your provider's office. Have you signed up for the [facility] Nia, if not please consider doing so to receive results on the nia as soon as they have been resulted by the system. [email or web address]     Medication Prior Authorizations may take up to 30 days for approval/denial.     You may be receiving a survey.  Please take the time to complete this, as your feedback is very important to us!  We strive to make your experience exceptional, and your comments help us with that goal.  We look forward to hearing from you!    For all future appointments please arrive 15 minutes prior to your scheduled visit.     Patient Contact Center Business Office: assistance with medical billing & financial inquires [phone]                Want to Say “Thank You” to a Nurse?  The ADIL[employer] Award® was created in memory of JOSE L Fuentes by his family to say thank you to nurses who provide an outstanding level of care.    Submit a nomination using any method below.     OR    [email or web address]  Or visit the Resource section   on your [facility] nia      [facility] [place]  Guide for Your PET/CT  PROCEDURE    Thank you for choosing [facility]  for your PET/CT.    Your doctor has scheduled you for a PET/CT procedure at [facility] .    Please arrive at: 9:15 AM (You should arrive at the hospital 15 minutes earlier than your scheduled PET/CT test for completing any necessary paperwork and to prepare you for your exam.)    Your appointment is scheduled for: Date: 10/9/24  Time: 9:30 AM    You can save time by Pre-Registering: Phone [phone] if you want to do so at your convenience. If you don?t call, expect someone from Pre-Registrations to contact you.    Please come to the Main entrance (revolving door) of the hospital and go to the Registration Desk to check in.    What is a PET/CT?  PET/CT procedures use a radioactive tracer to look for diseases in the body. The radioactive tracer will be injected into a vein and will travel through the blood and collect in organs and tissue. The PET scan portion shows how cells in your body are working. The CT shows the cell makeup and together they provide your doctor with information about your condition and if necessary, possible treatment options.    PET/CT may be used to:   Diagnose, stage and restage cancer, to diagnosis heart problems and brain disorders.   A specialized PET/CT scanner is used to take pictures of the body. The one at [facility] is presently a mobile unit that is here once per week.    Is PET/CT a safe procedure?  The amount of radiation you?re exposed to is so small that the risk of any damage to your cells is extremely low. The radioactive tracer that is used is totally gone from the body in 24 hours. The benefits of the test ay far outweigh the small risk. In addition, treat care is taken by the technologists to use the lowest radiation dose needed to produce the best images.    If you are pregnant, please notify your doctor and [facility] staff.    Please check with your doctor if you are uncertain about any  of the above information.    What should I do before coming in for my PET/CT test?    You will be contacted by [facility] Medical Staff prior to your appointment so they may get some information from you about your health history.    PATIENT PREPS    1. Non-diabetic patient prep:  a. Do not eat or drink (except water) for 8 hours prior to scheduled arrival time. We recommend a high protein-low carb diet 8 hours prior to scheduled arrival time.    b. Wear comfortable clothing, preferably with no metal (snaps, zippers, etc.).    c. No exercise 24 hours prior to your test.    d. No chewing gum, candy, or smoking 8 hours prior to test.    REMEMBER: YOU MAY HAVE PLAIN WATER THROUGHOUT THE DAY    Certain medications may interfere with the test. Be sure to talk to your doctor about prescriptions and over-the-counter medications.    2. Diabetic patient prep:  a. Do not eat or drink anything (except water) 4 hours before scheduled arrival time. We recommend a high protein-low carb diet 4 hours before your scheduled arrival test time.    b. Wear comfortable clothing, preferably with no metal (snaps, zippers, etc.).    c. No exercise 24 hours prior to your test.    d. No chewing gum, candy, or smoking 8 hours before the scheduled arrival time.    REMEMBER: YOU MAY DRINK PLAIN WATER THROUGHOUT THE DAY    Certain medications may interfere with the test. Be sure to talk to your doctor about prescriptions and over-the-counter medications. Consult your physician to determine the amount of insulin to take when fasting if need be.    ALL PATIENT INFO:  If you are claustrophic, consult your doctor and get a prescription for medication to relax you during the test. You will need to arrange for a ride after the test if you receive medication.    How long does a PET/CT test take?  From the time you arrive the test will take at least two hours in total to complete.    What will happen during my PET/CT test?  Before the test you will have an  IV started in your arm by the technologist. A radioactive tracer will be injected through the IV and your scan will start about 1 hour after your injection.    You will lie on a table that slides into a tunnel-shaped hole in the center of the PET/CT scanner. You will not feel anything during the scan. The PET/CT scanner detects energy given off by the tracer and converts it to a 3D picture that is displayed on a computer screen.    What happens after the test?  You will be able to leave right after the scan is done. You will be able to resume your normal diet and you are encouraged to drink plenty of fluids to help flush the radioactive tracer from your body.    A specialized trained radiologist will interpret the PET/CT scan and report the results to your doctor. Your doctor will schedule a time with you to discuss the results of your PET/CT test. If your doctor did not schedule a follow up appointment, ask them when you can expect to hear from them.    How will I be billed for this?  Please bring your insurance card and referral/authorization form (if necessary) on the day of your appointment.    Most insurance carriers require pre-certification for PET/CT tests. Failure to get pre-certification may result in a delay in getting your PET/CT procedure.    All charges, including those from [facility] and from the doctors who perform or read your PET/CT test will be submitted to your insurance carrier.    For information on financial counseling or payment questions, call [phone].    What if I have to cancel my appointment?   As special supplies are ordered for each patient for your PET/CT it is important to notify [facility] as soon as possible if you cannot make your appointment.   Please notify us at least one day in advance if you cannot make your appointment. The courtesy assists the staff in daily scheduling of other patients who may be waiting for an appointment.    Please call [phone] (Ext. [phone]) to  cancel or reschedule an appointment.              no wheezing/no dyspnea/no cough/no hemoptysis/no pleuritic chest pain